# Patient Record
(demographics unavailable — no encounter records)

---

## 2025-02-03 NOTE — HISTORY OF PRESENT ILLNESS
[FreeTextEntry1] : 45yo female with h/o of stones, previously seen by Dr. Stanley. Pt first stone at age 39 and passed it on her own in 2017. Stone was CaOx. No family hx of stones. Was seeing an outside urologist for sometime and transferred care here. Had done 24h urine in  that showed decreased volume (1.85L). Does not appear to be full 24h urine testing. Had US 2021 that showed on 8mm LMP stone (suspect it is multiple stones abutting based on old CT scans from ). 24h urine  showed pretty good urine volumes (2-2.38L), elevated urine calcium with variable pH and salt. She was counseled about dietary changes. Aug 2023 US 2022 that showed unchanged LMP stone(s). Also with "findings suggestive of medullary sponge kidney". US in Aug 2023 showed LMP 5mm stone but new 4mm RUP stone.   Today the patient presents for follow up after being counseled on dietary modifications and receiving 6 month repeat imaging renal and bladder imaging. Imaging shows no calculi. Patient reports doing very well with no pain or hematuria. No stone episodes. She reports she has been watching sodium intake in the diet. Has systemic complaints from perimenopausal changes.  Last period 24; no UTI S/Sx Up to date w all screening.  M ; passed at 68 from COPD/smoker D alive at 71 yo  pt quit smoking 20 yrs ago She smoked 1/2 pk per day x10 yrs  no voiding complaints

## 2025-02-03 NOTE — ASSESSMENT
[FreeTextEntry1] : Pt first followed for a stable LK stone and then had imaging showing a new RK UP stone. However, on 2/19/24 ANNABELLA showed no hydro or stones. Had KUB xray as well, showing no stones.  Avoid salt hydrate w 90 ounces of water daily Don't take vit c or calcium.  Pt is on Vit D3, as she was slightly low at 28  Cr 0.76  reviewed dietary precautions in detail.  Will see her in 6 mo for ANNABELLA.

## 2025-03-05 NOTE — ASSESSMENT
[FreeTextEntry1] : Small nodule behind the right ear is consistent with a reactive lymph node.  I reassured the patient.  We will do blood work and observe the nodule for the next month.  If it should grow or become more tender or noticeable we will do further imaging workup

## 2025-03-05 NOTE — PHYSICAL EXAM
[No Lymphadenopathy] : no lymphadenopathy [Normal Supraclavicular Nodes] : no supraclavicular lymphadenopathy [Normal Posterior Cervical Nodes] : no posterior cervical lymphadenopathy [Normal Anterior Cervical Nodes] : no anterior cervical lymphadenopathy [Normal] : no rash [de-identified] : Nontender noninflamed, superficial lymph node less than 0.5 cm behind the right ear no other periauricular lymph nodes noted and no

## 2025-03-05 NOTE — HISTORY OF PRESENT ILLNESS
[FreeTextEntry8] : Presents today for an acute visit for a palpable nodule behind the right ear.  Patient does remark that she was treated with Augmentin for an upper respiratory/sinus infection last month.  Last week she noticed a palpable nodule behind her right external ear.  It was not red or tender but the nodule has not gone away and she was concerned.  She denies any fevers or chills or any lesions along her scalp around her head over the past week

## 2025-03-05 NOTE — DISCUSSION/SUMMARY
[FreeTextEntry1] : A - 4cm right adnexal cyst - resolved  P- sono today by ROGER Serrano reveals no adnexal cysts or masses     pt reassured,          f/u prn. 
no

## 2025-03-05 NOTE — HISTORY OF PRESENT ILLNESS
[FreeTextEntry1] : climacteric female presents for evaluation , after having abdominal sono to evaluate nephrolithiasis, incidentally identified a 4.4cm right adnexal cyst. pt is asymptomatic, but was told to f/u with GYN LMP Feb. 2024.

## 2025-03-25 NOTE — HISTORY OF PRESENT ILLNESS
[FreeTextEntry1] : pt presents for follow up for discussion on HRT. pt is climacteric and has not had vaginal bleeding pt c/o brain fog and bloating, pt reports mild VMS, ( FSH/LH are elevated) and low libido and vaginal dryness

## 2025-03-25 NOTE — DISCUSSION/SUMMARY
[FreeTextEntry1] : A - Female climacteric state  p- discussed  HRT vs vaginal E2  vs. lifestyle modifications.      had and extensive discussion     will prescribe  Vaginal E2  for now, but will hold off on HRT     f/u prn

## 2025-05-01 NOTE — HISTORY OF PRESENT ILLNESS
Has upcoming appt w/ PCP. Will send 3 months of fills to pharmacy.     [Home] : at home, [unfilled] , at the time of the visit. [Medical Office: ([facility])___] : at the medical office located in  [Telehealth (audio & video)] : This visit was provided via telehealth using real-time 2-way audio visual technology. [Verbal consent obtained from patient] : the patient, [unfilled] [FreeTextEntry1] :  Scheduled for screening colonoscopy  Seems to have mild IBS symptoms - Occ gets stomach aches - mild constipation and loose stool - takes MiraLAX prn Gets bloated a lot Avoids gluten - helps; Avoid some dairy Now post-menopausal - may have been playing a role  2 years ago had more constipation - resolved  Gets episodes of GERD - triggers: tomato sauce, junk food, eating out too much; late meals, citrus, EtOH TUMS and Dahlia Ringgold help Rare dysphagia - chips, big pills

## 2025-05-01 NOTE — ASSESSMENT
[FreeTextEntry1] : 47-year-old woman, with chronic bloating and mild IBS (alternating constipation and diarrhea), complaining of chronic GERD and occasional dysphagia  She is scheduled for a screening colonoscopy next week Given upper GI symptoms, can add EGD to rule out upper GI pathology at the time of the colonoscopy  Answered all questions

## 2025-06-03 NOTE — HISTORY OF PRESENT ILLNESS
[FreeTextEntry1] : Patient presents today for CPE.\par   [de-identified] : Patient has been in good overall health this past year.  She started seeing a functional doctor  last year who did a series of blood work.    She also had a follow-up appointment with a cardiologist at [facility] Dr. Lexa Velazquez in [place] for July 2024.  She had a stress test, echo and possible CT heart scan last year?  She reports her anxiety symptoms are improved since last year.  Her older 24yo  son is doing better and he is moving to [place] this month.  Her younger son aMn is 21 yrs old is attending school/lives at home.  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  She does not not feel she needs a daily medication to treat her own anxiety at this time. She takes lorazepam 0.5 mg half a tablet p.r.n. on a very rare occasion.  Overall she is able to manage her symptoms without any medications.

## 2025-06-03 NOTE — HEALTH RISK ASSESSMENT
[Good] : ~his/her~  mood as  good [Yes] : Yes [2 - 4 times a month (2 pts)] : 2-4 times a month (2 points) [Never (0 pts)] : Never (0 points) [No] : In the past 12 months have you used drugs other than those required for medical reasons? No [Never] : Never [Patient reported mammogram was normal] : Patient reported mammogram was normal [Patient reported PAP Smear was normal] : Patient reported PAP Smear was normal [HIV test declined] : HIV test declined [Hepatitis C test declined] : Hepatitis C test declined [de-identified] : dermatologist for recurrent rash along the right side of her nose/nostril  [Audit-CScore] : 2 [de-identified] : walks 1-2 times a week [de-identified] : recently cut out gluten and follows a low glucose and low cholesterol diet. [MammogramDate] : 09/24 [PapSmearDate] : 09/24 [PapSmearComments] : with  Dr. Douglass-now perimenopause with irregular cycles

## 2025-06-03 NOTE — PHYSICAL EXAM
[No Acute Distress] : no acute distress [Well Nourished] : well nourished [Well-Appearing] : well-appearing [Well Developed] : well developed [Normal Sclera/Conjunctiva] : normal sclera/conjunctiva [PERRL] : pupils equal round and reactive to light [EOMI] : extraocular movements intact [Normal Outer Ear/Nose] : the outer ears and nose were normal in appearance [Normal Oropharynx] : the oropharynx was normal [No JVD] : no jugular venous distention [No Lymphadenopathy] : no lymphadenopathy [Supple] : supple [Thyroid Normal, No Nodules] : the thyroid was normal and there were no nodules present [No Respiratory Distress] : no respiratory distress  [No Accessory Muscle Use] : no accessory muscle use [Clear to Auscultation] : lungs were clear to auscultation bilaterally [Normal Rate] : normal rate  [Regular Rhythm] : with a regular rhythm [Normal S1, S2] : normal S1 and S2 [No Murmur] : no murmur heard [No Carotid Bruits] : no carotid bruits [No Abdominal Bruit] : a ~M bruit was not heard ~T in the abdomen [No Varicosities] : no varicosities [Pedal Pulses Present] : the pedal pulses are present [No Edema] : there was no peripheral edema [No Palpable Aorta] : no palpable aorta [No Extremity Clubbing/Cyanosis] : no extremity clubbing/cyanosis [Normal Appearance] : normal in appearance [No Axillary Lymphadenopathy] : no axillary lymphadenopathy [Soft] : abdomen soft [Non Tender] : non-tender [Non-distended] : non-distended [No Masses] : no abdominal mass palpated [No HSM] : no HSM [Normal Bowel Sounds] : normal bowel sounds [Normal Posterior Cervical Nodes] : no posterior cervical lymphadenopathy [Normal Anterior Cervical Nodes] : no anterior cervical lymphadenopathy [No CVA Tenderness] : no CVA  tenderness [No Spinal Tenderness] : no spinal tenderness [No Joint Swelling] : no joint swelling [Grossly Normal Strength/Tone] : grossly normal strength/tone [Coordination Grossly Intact] : coordination grossly intact [No Focal Deficits] : no focal deficits [Normal Gait] : normal gait [Deep Tendon Reflexes (DTR)] : deep tendon reflexes were 2+ and symmetric [Normal] : normal gait, coordination grossly intact, no focal deficits and deep tendon reflexes were 2+ and symmetric [Normal Affect] : the affect was normal [Normal Insight/Judgement] : insight and judgment were intact [de-identified] : red inflamed rash along right nasolabial fold and around right notril

## 2025-06-03 NOTE — PHYSICAL EXAM
[No Acute Distress] : no acute distress [Well Nourished] : well nourished [Well Developed] : well developed [Well-Appearing] : well-appearing [Normal Sclera/Conjunctiva] : normal sclera/conjunctiva [PERRL] : pupils equal round and reactive to light [EOMI] : extraocular movements intact [Normal Outer Ear/Nose] : the outer ears and nose were normal in appearance [Normal Oropharynx] : the oropharynx was normal [No JVD] : no jugular venous distention [No Lymphadenopathy] : no lymphadenopathy [Supple] : supple [Thyroid Normal, No Nodules] : the thyroid was normal and there were no nodules present [No Respiratory Distress] : no respiratory distress  [No Accessory Muscle Use] : no accessory muscle use [Clear to Auscultation] : lungs were clear to auscultation bilaterally [Normal Rate] : normal rate  [Regular Rhythm] : with a regular rhythm [Normal S1, S2] : normal S1 and S2 [No Murmur] : no murmur heard [No Carotid Bruits] : no carotid bruits [No Abdominal Bruit] : a ~M bruit was not heard ~T in the abdomen [No Varicosities] : no varicosities [Pedal Pulses Present] : the pedal pulses are present [No Edema] : there was no peripheral edema [No Palpable Aorta] : no palpable aorta [No Extremity Clubbing/Cyanosis] : no extremity clubbing/cyanosis [Normal Appearance] : normal in appearance [No Axillary Lymphadenopathy] : no axillary lymphadenopathy [Soft] : abdomen soft [Non Tender] : non-tender [Non-distended] : non-distended [No Masses] : no abdominal mass palpated [No HSM] : no HSM [Normal Bowel Sounds] : normal bowel sounds [Normal Posterior Cervical Nodes] : no posterior cervical lymphadenopathy [Normal Anterior Cervical Nodes] : no anterior cervical lymphadenopathy [No CVA Tenderness] : no CVA  tenderness [No Spinal Tenderness] : no spinal tenderness [No Joint Swelling] : no joint swelling [Grossly Normal Strength/Tone] : grossly normal strength/tone [Coordination Grossly Intact] : coordination grossly intact [No Focal Deficits] : no focal deficits [Normal Gait] : normal gait [Deep Tendon Reflexes (DTR)] : deep tendon reflexes were 2+ and symmetric [Normal] : normal gait, coordination grossly intact, no focal deficits and deep tendon reflexes were 2+ and symmetric [Normal Affect] : the affect was normal [Normal Insight/Judgement] : insight and judgment were intact [de-identified] : red inflamed rash along right nasolabial fold and around right notril

## 2025-06-03 NOTE — HEALTH RISK ASSESSMENT
[Good] : ~his/her~  mood as  good [Yes] : Yes [2 - 4 times a month (2 pts)] : 2-4 times a month (2 points) [Never (0 pts)] : Never (0 points) [No] : In the past 12 months have you used drugs other than those required for medical reasons? No [Never] : Never [Patient reported mammogram was normal] : Patient reported mammogram was normal [Patient reported PAP Smear was normal] : Patient reported PAP Smear was normal [HIV test declined] : HIV test declined [Hepatitis C test declined] : Hepatitis C test declined [de-identified] : dermatologist for recurrent rash along the right side of her nose/nostril  [Audit-CScore] : 2 [de-identified] : walks 1-2 times a week [de-identified] : recently cut out gluten and follows a low glucose and low cholesterol diet. [MammogramDate] : 09/24 [PapSmearDate] : 09/24 [PapSmearComments] : with  Dr. Douglass-now perimenopause with irregular cycles

## 2025-06-03 NOTE — HISTORY OF PRESENT ILLNESS
[FreeTextEntry1] : Patient presents today for CPE.\par   [de-identified] : Patient has been in good overall health this past year.  She started seeing a functional doctor  last year who did a series of blood work.    She also had a follow-up appointment with a cardiologist at [facility] Dr. Lexa Velazquez in [place] for July 2024.  She had a stress test, echo and possible CT heart scan last year?  She reports her anxiety symptoms are improved since last year.  Her older 22yo  son is doing better and he is moving to [place] this month.  Her younger son Man is 21 yrs old is attending school/lives at home.  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  She does not not feel she needs a daily medication to treat her own anxiety at this time. She takes lorazepam 0.5 mg half a tablet p.r.n. on a very rare occasion.  Overall she is able to manage her symptoms without any medications.